# Patient Record
Sex: MALE | Race: WHITE | Employment: UNEMPLOYED | URBAN - NONMETROPOLITAN AREA
[De-identification: names, ages, dates, MRNs, and addresses within clinical notes are randomized per-mention and may not be internally consistent; named-entity substitution may affect disease eponyms.]

---

## 2018-09-11 ENCOUNTER — OFFICE VISIT (OUTPATIENT)
Dept: FAMILY MEDICINE CLINIC | Age: 38
End: 2018-09-11

## 2018-09-11 VITALS
HEIGHT: 65 IN | BODY MASS INDEX: 23.49 KG/M2 | TEMPERATURE: 98.2 F | OXYGEN SATURATION: 97 % | DIASTOLIC BLOOD PRESSURE: 62 MMHG | SYSTOLIC BLOOD PRESSURE: 118 MMHG | HEART RATE: 97 BPM | WEIGHT: 141 LBS

## 2018-09-11 DIAGNOSIS — Z13.220 SCREENING FOR LIPID DISORDERS: ICD-10-CM

## 2018-09-11 DIAGNOSIS — Z23 NEED FOR TETANUS BOOSTER: ICD-10-CM

## 2018-09-11 DIAGNOSIS — R00.2 PALPITATIONS: Primary | ICD-10-CM

## 2018-09-11 DIAGNOSIS — Z13.0 SCREENING FOR DEFICIENCY ANEMIA: ICD-10-CM

## 2018-09-11 LAB
BASOPHILS ABSOLUTE: 0 K/UL (ref 0–0.2)
BASOPHILS RELATIVE PERCENT: 0.9 %
EOSINOPHILS ABSOLUTE: 0.1 K/UL (ref 0–0.6)
EOSINOPHILS RELATIVE PERCENT: 2.9 %
HCT VFR BLD CALC: 46.6 % (ref 40.5–52.5)
HEMOGLOBIN: 15.4 G/DL (ref 13.5–17.5)
LYMPHOCYTES ABSOLUTE: 2.1 K/UL (ref 1–5.1)
LYMPHOCYTES RELATIVE PERCENT: 42.7 %
MCH RBC QN AUTO: 28.6 PG (ref 26–34)
MCHC RBC AUTO-ENTMCNC: 33.1 G/DL (ref 31–36)
MCV RBC AUTO: 86.5 FL (ref 80–100)
MONOCYTES ABSOLUTE: 0.4 K/UL (ref 0–1.3)
MONOCYTES RELATIVE PERCENT: 8.2 %
NEUTROPHILS ABSOLUTE: 2.2 K/UL (ref 1.7–7.7)
NEUTROPHILS RELATIVE PERCENT: 45.3 %
PDW BLD-RTO: 13.2 % (ref 12.4–15.4)
PLATELET # BLD: 181 K/UL (ref 135–450)
PMV BLD AUTO: 9.6 FL (ref 5–10.5)
RBC # BLD: 5.38 M/UL (ref 4.2–5.9)
WBC # BLD: 4.9 K/UL (ref 4–11)

## 2018-09-11 PROCEDURE — G8427 DOCREV CUR MEDS BY ELIG CLIN: HCPCS | Performed by: NURSE PRACTITIONER

## 2018-09-11 PROCEDURE — G8420 CALC BMI NORM PARAMETERS: HCPCS | Performed by: NURSE PRACTITIONER

## 2018-09-11 PROCEDURE — 36415 COLL VENOUS BLD VENIPUNCTURE: CPT | Performed by: NURSE PRACTITIONER

## 2018-09-11 PROCEDURE — 90715 TDAP VACCINE 7 YRS/> IM: CPT | Performed by: NURSE PRACTITIONER

## 2018-09-11 PROCEDURE — 99203 OFFICE O/P NEW LOW 30 MIN: CPT | Performed by: NURSE PRACTITIONER

## 2018-09-11 PROCEDURE — 90471 IMMUNIZATION ADMIN: CPT | Performed by: NURSE PRACTITIONER

## 2018-09-11 PROCEDURE — 4004F PT TOBACCO SCREEN RCVD TLK: CPT | Performed by: NURSE PRACTITIONER

## 2018-09-11 ASSESSMENT — ENCOUNTER SYMPTOMS
WHEEZING: 0
ABDOMINAL PAIN: 0
SHORTNESS OF BREATH: 0
NAUSEA: 0
SORE THROAT: 0
COUGH: 0
SINUS PRESSURE: 0
CHEST TIGHTNESS: 0
DIARRHEA: 0
CONSTIPATION: 0
VOMITING: 0

## 2018-09-11 ASSESSMENT — PATIENT HEALTH QUESTIONNAIRE - PHQ9
2. FEELING DOWN, DEPRESSED OR HOPELESS: 0
1. LITTLE INTEREST OR PLEASURE IN DOING THINGS: 0
SUM OF ALL RESPONSES TO PHQ9 QUESTIONS 1 & 2: 0
SUM OF ALL RESPONSES TO PHQ QUESTIONS 1-9: 0
SUM OF ALL RESPONSES TO PHQ QUESTIONS 1-9: 0

## 2018-09-11 NOTE — PROGRESS NOTES
[facility] PHYSICIAN PRACTICES  [facility]  [place] [address]  Dept: [phone]  Dept Fax: [phone]    Derrick Ricci is a 43 y.o. male who presents today for his medical conditions/complaints as noted below. Derrick Ricci is c/o of Established New Doctor and Irregular Heart Beat (states he heart rate goes up into the 130's when he is up and moving around. States he had been on med for this about 7 yrs or more )    Chief Complaint   Patient presents with   Keshia English Established New Doctor    Irregular Heart Beat     states he heart rate goes up into the 130's when he is up and moving around. States he had been on med for this about 7 yrs or more      HPI:     Palpitations    The current episode started 1 to 4 weeks ago. The problem occurs every several days. The problem has been waxing and waning. On average, each episode lasts 30 minutes. The symptoms are aggravated by caffeine. Associated symptoms include dizziness and an irregular heartbeat. Pertinent negatives include no anxiety, chest fullness, chest pain, coughing, diaphoresis, fever, nausea, near-syncope, numbness, shortness of breath, syncope, vomiting or weakness. He has tried nothing for the symptoms. The treatment provided no relief. Risk factors include being male, family history and smoking/tobacco exposure. There is no history of heart disease. 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 He does admit to drinking excessive amounts of caffeine including coffee, tea, and Mt. Dew throughout the day. Doesn't eat a well balanced diet according to patient's father. Has been treated with an unknown medication to help his heart rate many years ago but states he felt better and stopped the medication.      Past Medical History:   Diagnosis Date    Irregular heart beat     Kidney stone       Past Surgical History:   Procedure Laterality Date    KIDNEY STONE SURGERY History reviewed. No pertinent family history. Social History   Substance Use Topics    Smoking status: Current Every Day Smoker     Packs/day: 0.50     Years: 20.00     Types: Cigarettes    Smokeless tobacco: Never Used    Alcohol use No      No current outpatient prescriptions on file. No current facility-administered medications for this visit. Allergies   Allergen Reactions    Yellow Dyes (Non-Tartrazine)      Subjective:      Review of Systems   Constitutional: Negative for appetite change, diaphoresis, fatigue, fever and unexpected weight change. HENT: Negative for congestion, sinus pressure and sore throat. Respiratory: Negative for cough, chest tightness, shortness of breath and wheezing. Cardiovascular: Positive for palpitations. Negative for chest pain, leg swelling, syncope and near-syncope. Gastrointestinal: Negative for abdominal pain, constipation, diarrhea, nausea and vomiting. Genitourinary: Negative for decreased urine volume, difficulty urinating and frequency. Musculoskeletal: Negative for arthralgias and myalgias. Skin: Negative for rash. Neurological: Positive for dizziness. Negative for weakness, light-headedness and numbness. Psychiatric/Behavioral: The patient is not nervous/anxious. Objective:     Vitals:    09/11/18 1306   BP: 118/62   Pulse: 97   Temp: 98.2 °F (36.8 °C)   TempSrc: Oral   SpO2: 97%   Weight: 141 lb (64 kg)   Height: 5' 5\" (1.651 m)     Physical Exam   Constitutional: Vital signs are normal. He appears well-developed and well-nourished. HENT:   Head: Normocephalic and atraumatic. Right Ear: Hearing and external ear normal.   Left Ear: Hearing and external ear normal.   Nose: Nose normal.   Eyes: Pupils are equal, round, and reactive to light. Lids are normal.   Neck: Normal range of motion. Cardiovascular: Normal rate, regular rhythm, S1 normal, S2 normal, normal heart sounds and normal pulses.    No extrasystoles are

## 2018-09-12 LAB
A/G RATIO: 1.7 (ref 1.1–2.2)
ALBUMIN SERPL-MCNC: 4.5 G/DL (ref 3.4–5)
ALP BLD-CCNC: 141 U/L (ref 40–129)
ALT SERPL-CCNC: 46 U/L (ref 10–40)
ANION GAP SERPL CALCULATED.3IONS-SCNC: 16 MMOL/L (ref 3–16)
AST SERPL-CCNC: 19 U/L (ref 15–37)
BILIRUB SERPL-MCNC: 0.3 MG/DL (ref 0–1)
BUN BLDV-MCNC: 10 MG/DL (ref 7–20)
CALCIUM SERPL-MCNC: 9.9 MG/DL (ref 8.3–10.6)
CHLORIDE BLD-SCNC: 100 MMOL/L (ref 99–110)
CHOLESTEROL, TOTAL: 155 MG/DL (ref 0–199)
CO2: 24 MMOL/L (ref 21–32)
CREAT SERPL-MCNC: 0.8 MG/DL (ref 0.9–1.3)
GFR AFRICAN AMERICAN: >60
GFR NON-AFRICAN AMERICAN: >60
GLOBULIN: 2.6 G/DL
GLUCOSE BLD-MCNC: 135 MG/DL (ref 70–99)
HDLC SERPL-MCNC: 32 MG/DL (ref 40–60)
LDL CHOLESTEROL CALCULATED: 91 MG/DL
POTASSIUM SERPL-SCNC: 4.3 MMOL/L (ref 3.5–5.1)
SODIUM BLD-SCNC: 140 MMOL/L (ref 136–145)
T4 FREE: 1.9 NG/DL (ref 0.9–1.8)
TOTAL PROTEIN: 7.1 G/DL (ref 6.4–8.2)
TRIGL SERPL-MCNC: 161 MG/DL (ref 0–150)
TSH SERPL DL<=0.05 MIU/L-ACNC: 2.37 UIU/ML (ref 0.27–4.2)
VLDLC SERPL CALC-MCNC: 32 MG/DL

## 2018-09-13 ENCOUNTER — HOSPITAL ENCOUNTER (OUTPATIENT)
Dept: NON INVASIVE DIAGNOSTICS | Age: 38
Discharge: HOME | End: 2018-09-13
Payer: MEDICARE

## 2018-09-13 DIAGNOSIS — R00.2 PALPITATIONS: ICD-10-CM

## 2018-09-13 PROCEDURE — 93225 XTRNL ECG REC<48 HRS REC: CPT

## 2018-09-13 NOTE — PROGRESS NOTES
[facility] WITHOUT COMPLICATIONS. PT. VERBALLY UNDERSTANDS INSTRUCTIONS GIVEN.   UNIT:D  [id number]

## 2018-09-17 LAB
ACQUISITION DURATION: NORMAL S
AVERAGE HEART RATE: 99 BPM
EKG DIAGNOSIS: NORMAL
HOLTER MAX HEART RATE: 151 BPM
HOOKUP DATE: NORMAL
HOOKUP TIME: NORMAL
MAX HEART RATE TIME/DATE: NORMAL
MIN HEART RATE TIME/DATE: NORMAL
MIN HEART RATE: 61 BPM
NUMBER OF QRS COMPLEXES: NORMAL
NUMBER OF SUPRAVENTRICULAR BEATS IN RUNS: 0
NUMBER OF SUPRAVENTRICULAR COUPLETS: 0
NUMBER OF SUPRAVENTRICULAR ECTOPICS: 545
NUMBER OF SUPRAVENTRICULAR ISOLATED BEATS: 545
NUMBER OF SUPRAVENTRICULAR RUNS: 0
NUMBER OF VENTRICULAR BEATS IN RUNS: 0
NUMBER OF VENTRICULAR BIGEMINAL CYCLES: 0
NUMBER OF VENTRICULAR COUPLETS: 0
NUMBER OF VENTRICULAR ECTOPICS: 0
NUMBER OF VENTRICULAR ISOLATED BEATS: 0
NUMBER OF VENTRICULAR RUNS: 0

## 2018-09-18 DIAGNOSIS — R00.0 TACHYARRHYTHMIA: Primary | ICD-10-CM

## 2018-09-18 DIAGNOSIS — I49.9 IRREGULAR HEART RATE: Primary | ICD-10-CM

## 2018-10-14 DIAGNOSIS — R00.0 TACHYARRHYTHMIA: ICD-10-CM

## 2018-10-15 NOTE — TELEPHONE ENCOUNTER
Refill Request for metoprolol tartrate (LOPRESSOR) 25 MG tablet     Last visit- 9/11/18    Told to follow up- none    Pending appointment- None    Additional Comments -

## 2018-11-13 PROBLEM — R00.0 TACHYCARDIA: Status: ACTIVE | Noted: 2018-11-13

## 2018-11-13 PROBLEM — R00.2 PALPITATIONS: Status: ACTIVE | Noted: 2018-11-13

## 2018-11-14 ENCOUNTER — OFFICE VISIT (OUTPATIENT)
Dept: CARDIOLOGY CLINIC | Age: 38
End: 2018-11-14
Payer: MEDICARE

## 2018-11-14 ENCOUNTER — HOSPITAL ENCOUNTER (OUTPATIENT)
Age: 38
Discharge: HOME | End: 2018-11-14
Payer: MEDICARE

## 2018-11-14 ENCOUNTER — HOSPITAL ENCOUNTER (OUTPATIENT)
Dept: GENERAL RADIOLOGY | Age: 38
Discharge: HOME | End: 2018-11-14
Payer: MEDICARE

## 2018-11-14 VITALS
BODY MASS INDEX: 23.99 KG/M2 | DIASTOLIC BLOOD PRESSURE: 76 MMHG | SYSTOLIC BLOOD PRESSURE: 118 MMHG | HEIGHT: 65 IN | OXYGEN SATURATION: 95 % | WEIGHT: 144 LBS | HEART RATE: 69 BPM

## 2018-11-14 DIAGNOSIS — R07.89 OTHER CHEST PAIN: ICD-10-CM

## 2018-11-14 DIAGNOSIS — R00.2 PALPITATIONS: Primary | ICD-10-CM

## 2018-11-14 DIAGNOSIS — R00.0 TACHYCARDIA: ICD-10-CM

## 2018-11-14 DIAGNOSIS — F17.200 SMOKER: ICD-10-CM

## 2018-11-14 DIAGNOSIS — R00.0 TACHYARRHYTHMIA: ICD-10-CM

## 2018-11-14 PROCEDURE — G8420 CALC BMI NORM PARAMETERS: HCPCS | Performed by: INTERNAL MEDICINE

## 2018-11-14 PROCEDURE — 93000 ELECTROCARDIOGRAM COMPLETE: CPT | Performed by: INTERNAL MEDICINE

## 2018-11-14 PROCEDURE — G8484 FLU IMMUNIZE NO ADMIN: HCPCS | Performed by: INTERNAL MEDICINE

## 2018-11-14 PROCEDURE — 71046 X-RAY EXAM CHEST 2 VIEWS: CPT

## 2018-11-14 PROCEDURE — 99204 OFFICE O/P NEW MOD 45 MIN: CPT | Performed by: INTERNAL MEDICINE

## 2018-11-14 PROCEDURE — G8427 DOCREV CUR MEDS BY ELIG CLIN: HCPCS | Performed by: INTERNAL MEDICINE

## 2018-11-14 NOTE — COMMUNICATION BODY
bleeding  Back:  no pain to palpation  Joint:  no active joint inflammation  Musculoskeletal:  negative  Skin:  Warm and dry  Neck:  Negative for JVD and Carotid Bruits. Chest:  Clear to auscultation, respiration easy  Cardiovascular:  RRR, S1S2 is wide but  Normal spliting, no murmur, no rub or thrill. Abdomen:  Soft normal liver and spleen  Extremities:   No edema, clubbing, cyanosis,  Pulses: pedal pulses are normal.  Neuro: intact    Medications:   Outpatient Encounter Prescriptions as of 11/14/2018   Medication Sig Dispense Refill    metoprolol tartrate (LOPRESSOR) 25 MG tablet TAKE 0.5 TABLETS BY MOUTH 2 TIMES DAILY 30 tablet 0     No facility-administered encounter medications on file as of 11/14/2018. Lab Data:  CBC: No results for input(s): WBC, HGB, HCT, MCV, PLT in the last 72 hours. BMP: No results for input(s): NA, K, CL, CO2, PHOS, BUN, CREATININE in the last 72 hours. Invalid input(s): CA  LIVER PROFILE: No results for input(s): AST, ALT, LIPASE, BILIDIR, BILITOT, ALKPHOS in the last 72 hours. Invalid input(s): AMYLASE,  ALB  LIPID:   Lab Results   Component Value Date    CHOL 155 09/11/2018     Lab Results   Component Value Date    TRIG 161 (H) 09/11/2018     Lab Results   Component Value Date    HDL 32 (L) 09/11/2018     Lab Results   Component Value Date    LDLCALC 91 09/11/2018     Lab Results   Component Value Date    LABVLDL 32 09/11/2018     No results found for: CHOLHDLRATIO  PT/INR: No results for input(s): PROTIME, INR in the last 72 hours. A1C: No results found for: LABA1C  BNP:  No results for input(s): BNP in the last 72 hours. IMAGING:   EKG 11/14/18  SR incomplete right BBB    24 hr Holter Monitor 9/13/18  1. The rhythm was sinus with sinus arrhythmia. Average ID interval 0.16,  average QRS duration. 10. Average daily heart rate 99 ranging from 61  to 151 bpm. Tachycardia 45% total test duration.   2. Occasional premature supraventricular ectopic beats total

## 2018-11-14 NOTE — PATIENT INSTRUCTIONS
Plan:  Orders Placed This Encounter   Procedures    XR CHEST STANDARD (2 VW)    EKG 12 lead    Echo 2D w doppler w color complete   will increase metoprolol to 25mg 1 twice a day  2. Scheduled return visit.  3 months

## 2018-11-14 NOTE — LETTER
[address] [address] [place] [address]  Phone: [phone]  Fax: [phone] [address] [place], [place]        November 14, 2018     RALPH Elaine - CNP  [facility] [address]  [facility] [address]    Patient: Corinna Dunne  MR Number: [id number]  YOB: 1980  Date of Visit: 11/14/2018    Dear Dr. Danny Mai: Thank you for the request for consultation for Jeannine Bullock to me for the evaluation. Below are the relevant portions of my assessment and plan of care. [facility] Office consultation Note  11/14/2018     Subjective:referred by Danny Mai CNP  Mr. Karla Blackwell is being seen today for [facility] cardiology evaluation for dizziness, irregular heart beat and abnormal holter monitor with PAC's. Today he reports since August 2018 having episodes of dizziness, off balance, irregular heart beats and palpitations. He reports occasional chest pain left anterior. 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 Chest pain is left anterior lasting 5-10 min it is nonexertional. It comes and goes without associated symptoms. He states PCP placed him on Metoprolol 12.5 mg BID which has helped him. He denies impaired hearing vertigo diplopia nausea vomiting or headache. HPI: Kidney stone, palpitations, tobacco abuse    Review of Systems:         12 point ROS negative in all areas as listed below except as in [place]  Constitutional, EENT, Cardiovascular, pulmonary, GI, , Musculoskeletal, skin, neurological, hematological, endocrine, Psychiatric    Reviewed past medical history, social, and family history.    Unemployed works at home, , smoker 1/2 ppd no alcohol denies drug use  Dad age 71 MI, PPM small cell lung cancer   Mom good health smoker age 61  Past Medical History:   Diagnosis Date    Irregular heart beat     Kidney stone      Past Surgical History:   Procedure Laterality Date

## 2018-11-16 ENCOUNTER — TELEPHONE (OUTPATIENT)
Dept: CARDIOLOGY CLINIC | Age: 38
End: 2018-11-16

## 2018-12-05 ENCOUNTER — HOSPITAL ENCOUNTER (OUTPATIENT)
Dept: NON INVASIVE DIAGNOSTICS | Age: 38
Discharge: HOME | End: 2018-12-05
Payer: MEDICARE

## 2018-12-05 DIAGNOSIS — R00.2 PALPITATIONS: ICD-10-CM

## 2018-12-05 DIAGNOSIS — R00.0 TACHYCARDIA: ICD-10-CM

## 2018-12-05 LAB
LV EF: 58 %
LVEF MODALITY: NORMAL

## 2018-12-05 PROCEDURE — 93306 TTE W/DOPPLER COMPLETE: CPT

## 2018-12-06 ENCOUNTER — TELEPHONE (OUTPATIENT)
Dept: CARDIOLOGY CLINIC | Age: 38
End: 2018-12-06

## 2018-12-20 ENCOUNTER — OFFICE VISIT (OUTPATIENT)
Dept: FAMILY MEDICINE CLINIC | Age: 38
End: 2018-12-20
Payer: MEDICARE

## 2018-12-20 VITALS
HEART RATE: 62 BPM | HEIGHT: 65 IN | BODY MASS INDEX: 23.96 KG/M2 | SYSTOLIC BLOOD PRESSURE: 92 MMHG | DIASTOLIC BLOOD PRESSURE: 57 MMHG | OXYGEN SATURATION: 98 %

## 2018-12-20 DIAGNOSIS — F41.9 ANXIETY: Primary | ICD-10-CM

## 2018-12-20 DIAGNOSIS — R45.86 MOOD SWINGS: ICD-10-CM

## 2018-12-20 PROCEDURE — G8420 CALC BMI NORM PARAMETERS: HCPCS | Performed by: NURSE PRACTITIONER

## 2018-12-20 PROCEDURE — G8427 DOCREV CUR MEDS BY ELIG CLIN: HCPCS | Performed by: NURSE PRACTITIONER

## 2018-12-20 PROCEDURE — 4004F PT TOBACCO SCREEN RCVD TLK: CPT | Performed by: NURSE PRACTITIONER

## 2018-12-20 PROCEDURE — G8484 FLU IMMUNIZE NO ADMIN: HCPCS | Performed by: NURSE PRACTITIONER

## 2018-12-20 PROCEDURE — 99213 OFFICE O/P EST LOW 20 MIN: CPT | Performed by: NURSE PRACTITIONER

## 2018-12-20 RX ORDER — BUPROPION HYDROCHLORIDE 150 MG/1
150 TABLET ORAL EVERY MORNING
Qty: 30 TABLET | Refills: 1 | Status: SHIPPED | OUTPATIENT
Start: 2018-12-20 | End: 2019-02-10 | Stop reason: SDUPTHER

## 2018-12-20 ASSESSMENT — ENCOUNTER SYMPTOMS
DIARRHEA: 0
VOMITING: 0
WHEEZING: 0
CONSTIPATION: 0
SINUS PAIN: 0
COUGH: 0
NAUSEA: 0
SORE THROAT: 0
SHORTNESS OF BREATH: 0

## 2018-12-20 NOTE — PROGRESS NOTES
[facility] PHYSICIAN PRACTICES  [place] [address]  Dept: [phone]  Dept Fax: [phone]    Milagros Segura is a 43 y.o. male who presents today for hismedical conditions/complaints as noted below. Milagros Segura is c/o of Depression (dads cancer gotten worse, terminal ) and Anxiety    Chief Complaint   Patient presents with    Depression     dads cancer gotten worse, terminal     Anxiety     HPI:     Mr. Sherial Lennox presents with complains of depression. He complains of depressed mood, difficulty concentrating, fatigue, feelings of worthlessness/guilt and hopelessness. Onset was approximately 3 weeks ago. Symptoms have been gradually worsening since that time. Patient denies recurrent thoughts of death, suicidal attempt, suicidal thoughts with specific plan and suicidal thoughts without plan. Family history significant for anxiety. Possible organic causes contributing are: Chronic medical cardiac conditions, recent new regarding father's terminal condition. Risk factors: previous episode of depression. Previous treatment includes none. Subjective:     Review of Systems   Constitutional: Negative for chills and fever. HENT: Negative for sinus pain and sore throat. Respiratory: Negative for cough, shortness of breath and wheezing. Cardiovascular: Negative for chest pain, palpitations and leg swelling. Gastrointestinal: Negative for constipation, diarrhea, nausea and vomiting. Genitourinary: Negative for dysuria and urgency. Skin: Negative for rash. Neurological: Negative for dizziness, seizures, weakness and headaches. Hematological: Does not bruise/bleed easily. Psychiatric/Behavioral: Positive for decreased concentration and dysphoric mood. Negative for self-injury, sleep disturbance and suicidal ideas. The patient is nervous/anxious.       Objective:     Vitals:    12/20/18 1748   BP: (!) 92/57   Pulse: 62   SpO2: 98%   Height: 5' 5\"

## 2018-12-26 ENCOUNTER — TELEPHONE (OUTPATIENT)
Dept: FAMILY MEDICINE CLINIC | Age: 38
End: 2018-12-26

## 2018-12-26 NOTE — TELEPHONE ENCOUNTER
I am not able to prescribe anything that will help to overcome loss of a loved one. Grieving is a difficult process for anyone. I would encourage him to use his current medication in which I gave at his last appointment. If he is having suicidal ideation or thoughts he should go to the ER.

## 2019-02-10 DIAGNOSIS — R45.86 MOOD SWINGS: ICD-10-CM

## 2019-02-10 DIAGNOSIS — F41.9 ANXIETY: ICD-10-CM

## 2019-02-11 RX ORDER — BUPROPION HYDROCHLORIDE 150 MG/1
TABLET ORAL
Qty: 30 TABLET | Refills: 1 | Status: SHIPPED | OUTPATIENT
Start: 2019-02-11 | End: 2019-04-13 | Stop reason: SDUPTHER

## 2019-03-18 DIAGNOSIS — F41.9 ANXIETY: ICD-10-CM

## 2019-03-18 DIAGNOSIS — R45.86 MOOD SWINGS: ICD-10-CM

## 2019-03-18 RX ORDER — BUPROPION HYDROCHLORIDE 150 MG/1
TABLET ORAL
Qty: 90 TABLET | Refills: 1 | Status: CANCELLED | OUTPATIENT
Start: 2019-03-18

## 2019-03-23 DIAGNOSIS — R00.0 TACHYARRHYTHMIA: ICD-10-CM

## 2019-04-11 DIAGNOSIS — R00.0 TACHYARRHYTHMIA: ICD-10-CM

## 2019-04-11 NOTE — TELEPHONE ENCOUNTER
Pt's wife alled for refill for metoprolol to be sent to [facility].  Pt scheduled with júnior on 5/1

## 2019-04-13 DIAGNOSIS — F41.9 ANXIETY: ICD-10-CM

## 2019-04-13 DIAGNOSIS — R45.86 MOOD SWINGS: ICD-10-CM

## 2019-04-15 RX ORDER — BUPROPION HYDROCHLORIDE 150 MG/1
TABLET ORAL
Qty: 30 TABLET | Refills: 1 | Status: SHIPPED | OUTPATIENT
Start: 2019-04-15 | End: 2020-01-20

## 2019-04-15 NOTE — TELEPHONE ENCOUNTER
Refill Request for buPROPion (WELLBUTRIN XL) 150 MG extended release tablet     Last visit- 12/20/18    Told to follow up- 2 months    Pending appointment- None    Additional Comments -

## 2019-04-30 NOTE — PROGRESS NOTES
[facility] Office consultation Note  5/1/2019     Subjective:  Mr. Jasson Jj is being seen today for follow up of  palpitations     HPI:   Today he reports occasional palpitations and dizziness. He reports increase in metoprolol last visit has helped with palpitations, but occasionally he forgets to take meds. Denies chest pain, shortness of breath, edema,and syncope. He continues to smoke 1/2ppd. PMH:   Kidney stone, palpitations, tobacco abuse    Review of Systems:         12 point ROS negative in all areas as listed below except as in [place]  Constitutional, EENT, Cardiovascular, pulmonary, GI, , Musculoskeletal, skin, neurological, hematological, endocrine, Psychiatric    Reviewed past medical history, social, and family history. Unemployed works at home, , smoker 1/2 ppd no alcohol denies drug use  Dad age 71 MI, PPM small cell lung cancer   Mom good health smoker age 61  Past Medical History:   Diagnosis Date    Irregular heart beat     Kidney stone      Past Surgical History:   Procedure Laterality Date    KIDNEY STONE SURGERY         Objective:   BP (!) 108/54   Pulse 58   Ht 5' 5\" (1.651 m)   Wt 139 lb (63 kg)   SpO2 97%   BMI 23.13 kg/m²     Wt Readings from Last 3 Encounters:   05/01/19 139 lb (63 kg)   11/14/18 144 lb (65.3 kg)   09/11/18 141 lb (64 kg)       Physical Exam:  General: No Respiratory distress, appears well developed and well nourished. Eyes:  Sclera nonicteric  Nose/Sinuses:  negative findings: nose shows no deformity, asymmetry, or inflammation, nasal mucosa normal, septum midline with no perforation or bleeding  Back:  no pain to palpation  Joint:  no active joint inflammation  Musculoskeletal:  negative  Skin:  Warm and dry  Neck:  Negative for JVD and Carotid Bruits. Chest:  Diminished lung sounds diffusely , respiration easy  Cardiovascular:  RRR, S1S2 is wide but  Normal spliting, no murmur, no rub or thrill.   Abdomen: Soft normal liver and spleen  Extremities:   No edema, clubbing, cyanosis,  Pulses: pedal pulses are normal.  Neuro: intact    Medications:   Outpatient Encounter Medications as of 5/1/2019   Medication Sig Dispense Refill    metoprolol tartrate (LOPRESSOR) 25 MG tablet TAKE 1 TABLET BY MOUTH TWICE A DAY 90 tablet 3    buPROPion (WELLBUTRIN XL) 150 MG extended release tablet TAKE 1 TABLET BY MOUTH EVERY DAY IN THE MORNING 30 tablet 1    [DISCONTINUED] metoprolol tartrate (LOPRESSOR) 25 MG tablet TAKE 1 TABLET BY MOUTH TWICE A DAY 60 tablet 0     No facility-administered encounter medications on file as of 5/1/2019. Lab Data:  CBC: No results for input(s): WBC, HGB, HCT, MCV, PLT in the last 72 hours. BMP: No results for input(s): NA, K, CL, CO2, PHOS, BUN, CREATININE in the last 72 hours. Invalid input(s): CA  LIVER PROFILE: No results for input(s): AST, ALT, LIPASE, BILIDIR, BILITOT, ALKPHOS in the last 72 hours. Invalid input(s): AMYLASE,  ALB  LIPID:   Lab Results   Component Value Date    CHOL 155 09/11/2018     Lab Results   Component Value Date    TRIG 161 (H) 09/11/2018     Lab Results   Component Value Date    HDL 32 (L) 09/11/2018     Lab Results   Component Value Date    LDLCALC 91 09/11/2018     Lab Results   Component Value Date    LABVLDL 32 09/11/2018     No results found for: CHOLHDLRATIO  PT/INR: No results for input(s): PROTIME, INR in the last 72 hours. A1C: No results found for: LABA1C  BNP:  No results for input(s): BNP in the last 72 hours. IMAGING:   ECHO 12/5/18  Summary   LV systolic function is normal with EF estimated at 55-60%.   No regional wall motion abnormalities are noted.   Left ventricular size is decreased.   Normal left ventricular diastolic filling pressure.   Mild mitral regurgitation.   Unable to obtain SPAP due to lack of tricuspid regurgitation.     Chest Xray 11/14/18  No acute abnormality    EKG 11/14/18  SR incomplete right BBB    24 hr Holter Monitor 9/13/18  1. The rhythm was sinus with sinus arrhythmia. Average OR interval 0.16,  average QRS duration. 10. Average daily heart rate 99 ranging from 61  to 151 bpm. Tachycardia 45% total test duration. 2. Occasional premature supraventricular ectopic beats total 545  consisting of 545 isolated PACs. 3. There were no premature ventricular ectopic beats [PVCs]. 4. Diary returned with entries of \"fluttering\" and \"dizzy\",  isolated PACs noted    Assessment:  1. Palpitations    2. Tachyarrhythmia     3. Dizziness is non specific  4. Chest pain atypical for angina    Plan:  No changes today  Follow up in [place]. 2020  2. QUALITY MEASURES  1. Tobacco Cessation Counseling: Yes  2. Retake of BP if >140/90:   NA  3. Documentation to PCP/referring for new patient:  Sent to PCP at close of office visit  4. CAD patient on anti-platelet: NA  5. CAD patient on STATIN therapy:  NA  6. Patient with CHF and aFib on anticoagulation:  NA     This note was scribed in the presence of Angel Reardon MD by Allen Francisco RN  I, Dr. Jc Cortes, personally performed the services described in this documentation, as scribed by the above signed scribe in my presence. It is both accurate and complete to my knowledge. I agree with the details independently gathered by the clinical support staff, while the remaining scribed note accurately describes my personal service to the patient.       Pearl Corea MD 5/1/2019 3:55 PM

## 2019-05-01 ENCOUNTER — OFFICE VISIT (OUTPATIENT)
Dept: CARDIOLOGY CLINIC | Age: 39
End: 2019-05-01
Payer: MEDICARE

## 2019-05-01 VITALS
DIASTOLIC BLOOD PRESSURE: 54 MMHG | HEIGHT: 65 IN | BODY MASS INDEX: 23.16 KG/M2 | SYSTOLIC BLOOD PRESSURE: 108 MMHG | HEART RATE: 58 BPM | OXYGEN SATURATION: 97 % | WEIGHT: 139 LBS

## 2019-05-01 DIAGNOSIS — R00.0 TACHYARRHYTHMIA: ICD-10-CM

## 2019-05-01 DIAGNOSIS — R00.2 PALPITATIONS: Primary | ICD-10-CM

## 2019-05-01 PROCEDURE — 99213 OFFICE O/P EST LOW 20 MIN: CPT | Performed by: INTERNAL MEDICINE

## 2019-05-01 PROCEDURE — G8427 DOCREV CUR MEDS BY ELIG CLIN: HCPCS | Performed by: INTERNAL MEDICINE

## 2019-05-01 PROCEDURE — G8420 CALC BMI NORM PARAMETERS: HCPCS | Performed by: INTERNAL MEDICINE

## 2019-05-01 PROCEDURE — 4004F PT TOBACCO SCREEN RCVD TLK: CPT | Performed by: INTERNAL MEDICINE

## 2019-05-01 NOTE — LETTER
[facility] Office consultation Note  5/1/2019     Subjective:  Mr. Shanique Robertson is being seen today for follow up of  palpitations     HPI:   Today he reports occasional palpitations and dizziness. He reports increase in metoprolol last visit has helped with palpitations, but occasionally he forgets to take meds. Denies chest pain, shortness of breath, edema,and syncope. He continues to smoke 1/2ppd. PMH:   Kidney stone, palpitations, tobacco abuse    Review of Systems:         12 point ROS negative in all areas as listed below except as in [place]  Constitutional, EENT, Cardiovascular, pulmonary, GI, , Musculoskeletal, skin, neurological, hematological, endocrine, Psychiatric    Reviewed past medical history, social, and family history. Unemployed works at home, , smoker 1/2 ppd no alcohol denies drug use  Dad age 71 MI, PPM small cell lung cancer   Mom good health smoker age 61  Past Medical History:   Diagnosis Date    Irregular heart beat     Kidney stone      Past Surgical History:   Procedure Laterality Date    KIDNEY STONE SURGERY         Objective:   BP (!) 108/54   Pulse 58   Ht 5' 5\" (1.651 m)   Wt 139 lb (63 kg)   SpO2 97%   BMI 23.13 kg/m²      Wt Readings from Last 3 Encounters:   05/01/19 139 lb (63 kg)   11/14/18 144 lb (65.3 kg)   09/11/18 141 lb (64 kg)       Physical Exam:  General: No Respiratory distress, appears well developed and well nourished. Eyes:  Sclera nonicteric  Nose/Sinuses:  negative findings: nose shows no deformity, asymmetry, or inflammation, nasal mucosa normal, septum midline with no perforation or bleeding  Back:  no pain to palpation  Joint:  no active joint inflammation  Musculoskeletal:  negative  Skin:  Warm and dry  Neck:  Negative for JVD and Carotid Bruits. Chest:  Diminished lung sounds diffusely , respiration easy  Cardiovascular:  RRR, S1S2 is wide but  Normal spliting, no murmur, no rub or thrill. Abdomen:  Soft normal liver and spleen  Extremities:   No edema, clubbing, cyanosis,  Pulses: pedal pulses are normal.  Neuro: intact    Medications:   Outpatient Encounter Medications as of 5/1/2019   Medication Sig Dispense Refill    metoprolol tartrate (LOPRESSOR) 25 MG tablet TAKE 1 TABLET BY MOUTH TWICE A DAY 90 tablet 3    buPROPion (WELLBUTRIN XL) 150 MG extended release tablet TAKE 1 TABLET BY MOUTH EVERY DAY IN THE MORNING 30 tablet 1    [DISCONTINUED] metoprolol tartrate (LOPRESSOR) 25 MG tablet TAKE 1 TABLET BY MOUTH TWICE A DAY 60 tablet 0     No facility-administered encounter medications on file as of 5/1/2019. Lab Data:  CBC: No results for input(s): WBC, HGB, HCT, MCV, PLT in the last 72 hours. BMP: No results for input(s): NA, K, CL, CO2, PHOS, BUN, CREATININE in the last 72 hours. Invalid input(s): CA  LIVER PROFILE: No results for input(s): AST, ALT, LIPASE, BILIDIR, BILITOT, ALKPHOS in the last 72 hours. Invalid input(s): AMYLASE,  ALB  LIPID:   Lab Results   Component Value Date    CHOL 155 09/11/2018     Lab Results   Component Value Date    TRIG 161 (H) 09/11/2018     Lab Results   Component Value Date    HDL 32 (L) 09/11/2018     Lab Results   Component Value Date    LDLCALC 91 09/11/2018     Lab Results   Component Value Date    LABVLDL 32 09/11/2018     No results found for: CHOLHDLRATIO  PT/INR: No results for input(s): PROTIME, INR in the last 72 hours. A1C: No results found for: LABA1C  BNP:  No results for input(s): BNP in the last 72 hours. IMAGING:   ECHO 12/5/18  Summary   LV systolic function is normal with EF estimated at 55-60%.   No regional wall motion abnormalities are noted.   Left ventricular size is decreased.   Normal left ventricular diastolic filling pressure.   Mild mitral regurgitation.   Unable to obtain SPAP due to lack of tricuspid regurgitation.     Chest Xray 11/14/18  No acute abnormality    EKG 11/14/18  SR incomplete right BBB 24 hr Holter Monitor 9/13/18  1. The rhythm was sinus with sinus arrhythmia. Average OR interval 0.16,  average QRS duration. 10. Average daily heart rate 99 ranging from 61  to 151 bpm. Tachycardia 45% total test duration. 2. Occasional premature supraventricular ectopic beats total 545  consisting of 545 isolated PACs. 3. There were no premature ventricular ectopic beats [PVCs]. 4. Diary returned with entries of \"fluttering\" and \"dizzy\",  isolated PACs noted    Assessment:  1. Palpitations    2. Tachyarrhythmia     3. Dizziness is non specific  4. Chest pain atypical for angina    Plan:  No changes today  Follow up in [place]. 2020  2. QUALITY MEASURES  1. Tobacco Cessation Counseling: Yes  2. Retake of BP if >140/90:   NA  3. Documentation to PCP/referring for new patient:  Sent to PCP at close of office visit  4. CAD patient on anti-platelet: NA  5. CAD patient on STATIN therapy:  NA  6. Patient with CHF and aFib on anticoagulation:  NA     This note was scribed in the presence of Estuardo Sanders MD by Trey Banda RN  I, Dr. Antony Strange, personally performed the services described in this documentation, as scribed by the above signed scribe in my presence. It is both accurate and complete to my knowledge. I agree with the details independently gathered by the clinical support staff, while the remaining scribed note accurately describes my personal service to the patient.       [address] [place], [place] 5/1/2019 3:55 PM

## 2019-05-16 DIAGNOSIS — R45.86 MOOD SWINGS: ICD-10-CM

## 2019-05-16 DIAGNOSIS — F41.9 ANXIETY: ICD-10-CM

## 2019-05-16 RX ORDER — BUPROPION HYDROCHLORIDE 150 MG/1
TABLET ORAL
Qty: 30 TABLET | Refills: 0 | OUTPATIENT
Start: 2019-05-16

## 2019-05-16 NOTE — TELEPHONE ENCOUNTER
Patient states he is going to stay here and see Dr. Stephanie Freeman. Patient is informed that he needs to make a follow up appointment was due back in February with Dr. Stephanie Freeman.

## 2020-01-20 ENCOUNTER — OFFICE VISIT (OUTPATIENT)
Dept: FAMILY MEDICINE CLINIC | Age: 40
End: 2020-01-20
Payer: MEDICARE

## 2020-01-20 VITALS
OXYGEN SATURATION: 98 % | WEIGHT: 145 LBS | BODY MASS INDEX: 24.13 KG/M2 | HEART RATE: 64 BPM | DIASTOLIC BLOOD PRESSURE: 70 MMHG | SYSTOLIC BLOOD PRESSURE: 102 MMHG

## 2020-01-20 LAB
C-REACTIVE PROTEIN: 1.1 MG/L (ref 0–5.1)
HCT VFR BLD CALC: 45.2 % (ref 40.5–52.5)
HEMOGLOBIN: 15.1 G/DL (ref 13.5–17.5)
MCH RBC QN AUTO: 29.7 PG (ref 26–34)
MCHC RBC AUTO-ENTMCNC: 33.4 G/DL (ref 31–36)
MCV RBC AUTO: 88.8 FL (ref 80–100)
PDW BLD-RTO: 12.8 % (ref 12.4–15.4)
PLATELET # BLD: 222 K/UL (ref 135–450)
PMV BLD AUTO: 9 FL (ref 5–10.5)
RBC # BLD: 5.08 M/UL (ref 4.2–5.9)
SEDIMENTATION RATE, ERYTHROCYTE: 10 MM/HR (ref 0–15)
TOTAL CK: 68 U/L (ref 39–308)
WBC # BLD: 6.2 K/UL (ref 4–11)

## 2020-01-20 PROCEDURE — G8420 CALC BMI NORM PARAMETERS: HCPCS | Performed by: FAMILY MEDICINE

## 2020-01-20 PROCEDURE — G8484 FLU IMMUNIZE NO ADMIN: HCPCS | Performed by: FAMILY MEDICINE

## 2020-01-20 PROCEDURE — G8427 DOCREV CUR MEDS BY ELIG CLIN: HCPCS | Performed by: FAMILY MEDICINE

## 2020-01-20 PROCEDURE — 99214 OFFICE O/P EST MOD 30 MIN: CPT | Performed by: FAMILY MEDICINE

## 2020-01-20 PROCEDURE — 36415 COLL VENOUS BLD VENIPUNCTURE: CPT | Performed by: FAMILY MEDICINE

## 2020-01-20 PROCEDURE — 4004F PT TOBACCO SCREEN RCVD TLK: CPT | Performed by: FAMILY MEDICINE

## 2020-01-20 RX ORDER — IBUPROFEN 600 MG/1
600 TABLET ORAL
Qty: 30 TABLET | Refills: 0 | Status: SHIPPED | OUTPATIENT
Start: 2020-01-20 | End: 2020-01-30

## 2020-01-20 ASSESSMENT — ENCOUNTER SYMPTOMS
BACK PAIN: 1
GASTROINTESTINAL NEGATIVE: 1
RESPIRATORY NEGATIVE: 1

## 2020-01-20 NOTE — PROGRESS NOTES
Subjective:      Patient ID: Castillo Loaiza is a 35 y.o. male. 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 Patient does not recall any specific precipitant though his work is somewhat arduous. He denies any neck pain or shoulder pain. No rash. No tick bite. No unusual illnesses among his animals. About 6 weeks ago patient began also experiencing pain in both hips and the gluteal regions. Nothing down the legs. No weakness. Does not really bother him to walk on it. Patient has chronic low back pain which may be is flared a little bit recently. No small joint complaints of the hands and feet. 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 He is on disability. But works fairly hard cutting wood particularly this time of year. Review of Systems   Constitutional: Positive for activity change. Negative for fatigue and unexpected weight change. Respiratory: Negative. Cardiovascular: Negative. Gastrointestinal: Negative. Musculoskeletal: Positive for back pain. Negative for arthralgias, gait problem and neck pain. Skin: Negative. background/entire past medical,social and family history obtained and reviewed/updated today   Objective:   Physical Exam  Vitals signs and nursing note reviewed. Constitutional:       Appearance: Normal appearance. He is normal weight. HENT:      Head: Normocephalic.       Nose: Nose normal.      Mouth/Throat:      Mouth: Mucous membranes are moist. Pharynx: Oropharynx is clear. Neck:      Musculoskeletal: Neck supple. Cardiovascular:      Rate and Rhythm: Normal rate and regular rhythm. Heart sounds: No murmur. Pulmonary:      Breath sounds: Normal breath sounds. No wheezing, rhonchi or rales. Musculoskeletal:      Comments: Both shoulders: Full range of motion  Both arms and movement at the elbow: Full range of motion  No tenderness in either of those areas. Upper extremity reflexes are 3+ and equal.  No weakness in the biceps muscles bilaterally. Back: Full range of motion with flexion and extension. Reflexes in the lower extremities 3+ and equal bilaterally  Intact toe walking  Hip range of motion unremarkable  Pulses normal   Lymphadenopathy:      Cervical: No cervical adenopathy. Neurological:      Mental Status: He is alert. /70   Pulse 64   Wt 145 lb (65.8 kg)   SpO2 98%   BMI 24.13 kg/m²     Assessment:      Alvino Vasquez was seen today for arm pain, hip pain and back pain. Diagnoses and all orders for this visit:    Bilateral arm pain  -     CK  -     C-Reactive Protein  -     Sedimentation Rate  -     CBC  -     ibuprofen (ADVIL;MOTRIN) 600 MG tablet;  Take 1 tablet by mouth 3 times daily (before meals) for 10 days           Plan:      [email or web address]    Patient Instructions   F/u depending on test results            Poonam Su MA

## 2020-11-04 ENCOUNTER — TELEPHONE (OUTPATIENT)
Dept: INTERNAL MEDICINE CLINIC | Age: 40
End: 2020-11-04